# Patient Record
Sex: FEMALE | Race: WHITE
[De-identification: names, ages, dates, MRNs, and addresses within clinical notes are randomized per-mention and may not be internally consistent; named-entity substitution may affect disease eponyms.]

---

## 2018-10-14 ENCOUNTER — HOSPITAL ENCOUNTER (EMERGENCY)
Dept: HOSPITAL 80 - FED | Age: 23
Discharge: HOME | End: 2018-10-14
Payer: COMMERCIAL

## 2018-10-14 VITALS — SYSTOLIC BLOOD PRESSURE: 104 MMHG | DIASTOLIC BLOOD PRESSURE: 66 MMHG
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
## 2018-10-14 DIAGNOSIS — S52.124A: Primary | ICD-10-CM

## 2018-10-14 PROCEDURE — A4565 SLINGS: HCPCS

## 2018-10-14 NOTE — EDPHY
H & P


Time Seen by Provider: 10/14/18 12:36


HPI/ROS: 





CHIEF COMPLAINT:  Right elbow pain





HISTORY OF PRESENT ILLNESS:  Patient is a 23-year-old female who was 

rollerblading today and fell onto an outstretched right arm.  She felt sudden 

pain in her right elbow.  She has had limited range of motion of the elbow 

since.  She denies any numbness in her fingers or arm.  She denies any neck 

pain or head injury.  She has tried no medication to alleviate her pain he had.

  She takes no prescribed medication.  She denies pregnancy.





ROS As detailed in HPI


Smoking Status: Never smoked


Physical Exam: 





General:  Alert and oriented.  Nontoxic appearing.  No acute distress


HEENT:  Pupils PERRLA. No oral lesions.  


Cardiopulmonary:  Regular rate and rhythm.  No lower extremity edema


Skin:  Pink warm and dry.  No lesions.


Muscle skeletal:  Moving all 4 extremities.  Equal strength in upper 

extremities and lower extremities.  Ambulatory.  Full flexion of the right 

elbow but missing approximately 20 degrees of full extension.  Neurovascular 

intact distal to the right elbow.  Tenderness over right radial head.





Constitutional: 


 Initial Vital Signs











Temperature (C)  36.7 C   10/14/18 12:15


 


Heart Rate  78   10/14/18 12:15


 


Respiratory Rate  18   10/14/18 12:15


 


Blood Pressure  108/73   10/14/18 12:15


 


O2 Sat (%)  97   10/14/18 12:15








 











O2 Delivery Mode               Room Air














Allergies/Adverse Reactions: 


 





No Known Allergies Allergy (Unverified 10/14/18 12:14)


 








Home Medications: 














 Medication  Instructions  Recorded


 


NK [No Known Home Meds]  10/14/18














Medical Decision Making





- Diagnostics


Imaging Results: 


 Imaging Impressions





Elbow X-Ray  10/14/18 12:17


Impression:  Suspect subtle nondisplaced radial head fracture. Recommend 

treating the elbow as if there is a fracture and obtaining follow-up x-rays in 7

-10 days.


 











Procedures: 





Patient placed in a shoulder sling and a to be neurovascular intact after the 

sling placement.


ED Course/Re-evaluation: 


23-year-old female here with FOOSH injury to the right elbow.  X-ray reveals 

sail sign a possible nondisplaced radial head fracture.  She was placed in a 

shoulder sling and given orthopedic follow-up.  She has no hand wrist or 

shoulder pain and no other signs of injury.


Differential Diagnosis: 





Fracture, dislocation, ligamentous instability, compartment syndrome





Departure





- Departure


Disposition: Home, Routine, Self-Care


Clinical Impression: 


 Radial head fracture, closed





Condition: Good


Instructions:  Elbow Fracture (ED)


Additional Instructions: 


You're x-ray shows probable radial head fracture.  Limited range of motion is 

likely due to blood within the joint.  Or the shoulder sling as given to today 

until he follow up with Orthopedics early this week.  Call tomorrow morning for 

an appointment.  Take the elbow out of the sling and do gentle range-of-motion 

a few times a day until he sees Orthopedics.  Use ice and ibuprofen for pain 

inflammation.


Referrals: 


NONE *PRIMARY CARE P,. [Primary Care Provider] - As per Instructions


Girish Zhao MD [Medical Doctor] - As per Instructions